# Patient Record
Sex: FEMALE | Race: OTHER | HISPANIC OR LATINO | URBAN - METROPOLITAN AREA
[De-identification: names, ages, dates, MRNs, and addresses within clinical notes are randomized per-mention and may not be internally consistent; named-entity substitution may affect disease eponyms.]

---

## 2017-11-01 ENCOUNTER — EMERGENCY (EMERGENCY)
Facility: HOSPITAL | Age: 32
LOS: 1 days | Discharge: HOME | End: 2017-11-01
Attending: EMERGENCY MEDICINE
Payer: SELF-PAY

## 2017-11-01 VITALS
OXYGEN SATURATION: 100 % | WEIGHT: 121.03 LBS | TEMPERATURE: 98 F | SYSTOLIC BLOOD PRESSURE: 124 MMHG | HEART RATE: 61 BPM | RESPIRATION RATE: 16 BRPM | DIASTOLIC BLOOD PRESSURE: 78 MMHG | HEIGHT: 62 IN

## 2017-11-01 VITALS — SYSTOLIC BLOOD PRESSURE: 118 MMHG | HEART RATE: 60 BPM | OXYGEN SATURATION: 99 % | RESPIRATION RATE: 16 BRPM

## 2017-11-01 LAB
ALBUMIN SERPL ELPH-MCNC: 4.2 G/DL — SIGNIFICANT CHANGE UP (ref 3.3–5.2)
ALP SERPL-CCNC: 87 U/L — SIGNIFICANT CHANGE UP (ref 40–120)
ALT FLD-CCNC: 20 U/L — SIGNIFICANT CHANGE UP
ANION GAP SERPL CALC-SCNC: 13 MMOL/L — SIGNIFICANT CHANGE UP (ref 5–17)
AST SERPL-CCNC: 21 U/L — SIGNIFICANT CHANGE UP
BASOPHILS # BLD AUTO: 0 K/UL — SIGNIFICANT CHANGE UP (ref 0–0.2)
BASOPHILS NFR BLD AUTO: 0.5 % — SIGNIFICANT CHANGE UP (ref 0–2)
BILIRUB SERPL-MCNC: 0.8 MG/DL — SIGNIFICANT CHANGE UP (ref 0.4–2)
BUN SERPL-MCNC: 11 MG/DL — SIGNIFICANT CHANGE UP (ref 8–20)
CALCIUM SERPL-MCNC: 9.3 MG/DL — SIGNIFICANT CHANGE UP (ref 8.6–10.2)
CHLORIDE SERPL-SCNC: 104 MMOL/L — SIGNIFICANT CHANGE UP (ref 98–107)
CO2 SERPL-SCNC: 22 MMOL/L — SIGNIFICANT CHANGE UP (ref 22–29)
CREAT SERPL-MCNC: 0.58 MG/DL — SIGNIFICANT CHANGE UP (ref 0.5–1.3)
D DIMER BLD IA.RAPID-MCNC: <150 NG/ML DDU — SIGNIFICANT CHANGE UP
EOSINOPHIL # BLD AUTO: 0.2 K/UL — SIGNIFICANT CHANGE UP (ref 0–0.5)
EOSINOPHIL NFR BLD AUTO: 2.6 % — SIGNIFICANT CHANGE UP (ref 0–6)
GLUCOSE SERPL-MCNC: 93 MG/DL — SIGNIFICANT CHANGE UP (ref 70–115)
HCG SERPL-ACNC: <2 MIU/ML — SIGNIFICANT CHANGE UP
HCT VFR BLD CALC: 38.9 % — SIGNIFICANT CHANGE UP (ref 37–47)
HGB BLD-MCNC: 13.4 G/DL — SIGNIFICANT CHANGE UP (ref 12–16)
LYMPHOCYTES # BLD AUTO: 2.8 K/UL — SIGNIFICANT CHANGE UP (ref 1–4.8)
LYMPHOCYTES # BLD AUTO: 32 % — SIGNIFICANT CHANGE UP (ref 20–55)
MCHC RBC-ENTMCNC: 30.9 PG — SIGNIFICANT CHANGE UP (ref 27–31)
MCHC RBC-ENTMCNC: 34.4 G/DL — SIGNIFICANT CHANGE UP (ref 32–36)
MCV RBC AUTO: 89.6 FL — SIGNIFICANT CHANGE UP (ref 81–99)
MONOCYTES # BLD AUTO: 0.5 K/UL — SIGNIFICANT CHANGE UP (ref 0–0.8)
MONOCYTES NFR BLD AUTO: 5.2 % — SIGNIFICANT CHANGE UP (ref 3–10)
NEUTROPHILS # BLD AUTO: 5.2 K/UL — SIGNIFICANT CHANGE UP (ref 1.8–8)
NEUTROPHILS NFR BLD AUTO: 59.4 % — SIGNIFICANT CHANGE UP (ref 37–73)
PLATELET # BLD AUTO: 238 K/UL — SIGNIFICANT CHANGE UP (ref 150–400)
POTASSIUM SERPL-MCNC: 3.7 MMOL/L — SIGNIFICANT CHANGE UP (ref 3.5–5.3)
POTASSIUM SERPL-SCNC: 3.7 MMOL/L — SIGNIFICANT CHANGE UP (ref 3.5–5.3)
PROT SERPL-MCNC: 7.9 G/DL — SIGNIFICANT CHANGE UP (ref 6.6–8.7)
RBC # BLD: 4.34 M/UL — LOW (ref 4.4–5.2)
RBC # FLD: 12.1 % — SIGNIFICANT CHANGE UP (ref 11–15.6)
SODIUM SERPL-SCNC: 139 MMOL/L — SIGNIFICANT CHANGE UP (ref 135–145)
TROPONIN T SERPL-MCNC: <0.01 NG/ML — SIGNIFICANT CHANGE UP (ref 0–0.06)
WBC # BLD: 8.8 K/UL — SIGNIFICANT CHANGE UP (ref 4.8–10.8)
WBC # FLD AUTO: 8.8 K/UL — SIGNIFICANT CHANGE UP (ref 4.8–10.8)

## 2017-11-01 PROCEDURE — 71045 X-RAY EXAM CHEST 1 VIEW: CPT

## 2017-11-01 PROCEDURE — 36415 COLL VENOUS BLD VENIPUNCTURE: CPT

## 2017-11-01 PROCEDURE — 93005 ELECTROCARDIOGRAM TRACING: CPT

## 2017-11-01 PROCEDURE — 99283 EMERGENCY DEPT VISIT LOW MDM: CPT | Mod: 25

## 2017-11-01 PROCEDURE — 84702 CHORIONIC GONADOTROPIN TEST: CPT

## 2017-11-01 PROCEDURE — 84484 ASSAY OF TROPONIN QUANT: CPT

## 2017-11-01 PROCEDURE — 85379 FIBRIN DEGRADATION QUANT: CPT

## 2017-11-01 PROCEDURE — T1013: CPT

## 2017-11-01 PROCEDURE — 80053 COMPREHEN METABOLIC PANEL: CPT

## 2017-11-01 PROCEDURE — 85027 COMPLETE CBC AUTOMATED: CPT

## 2017-11-01 PROCEDURE — 99285 EMERGENCY DEPT VISIT HI MDM: CPT

## 2017-11-01 PROCEDURE — 93010 ELECTROCARDIOGRAM REPORT: CPT

## 2017-11-01 PROCEDURE — 71010: CPT | Mod: 26

## 2017-11-01 RX ORDER — METHOCARBAMOL 500 MG/1
1500 TABLET, FILM COATED ORAL ONCE
Qty: 0 | Refills: 0 | Status: COMPLETED | OUTPATIENT
Start: 2017-11-01 | End: 2017-11-01

## 2017-11-01 RX ORDER — METHOCARBAMOL 500 MG/1
2 TABLET, FILM COATED ORAL
Qty: 24 | Refills: 0 | OUTPATIENT
Start: 2017-11-01 | End: 2017-11-05

## 2017-11-01 RX ORDER — ACETAMINOPHEN 500 MG
650 TABLET ORAL ONCE
Qty: 0 | Refills: 0 | Status: COMPLETED | OUTPATIENT
Start: 2017-11-01 | End: 2017-11-01

## 2017-11-01 RX ORDER — IBUPROFEN 200 MG
1 TABLET ORAL
Qty: 20 | Refills: 0 | OUTPATIENT
Start: 2017-11-01 | End: 2017-11-06

## 2017-11-01 RX ADMIN — METHOCARBAMOL 1500 MILLIGRAM(S): 500 TABLET, FILM COATED ORAL at 14:59

## 2017-11-01 RX ADMIN — Medication 650 MILLIGRAM(S): at 16:09

## 2017-11-01 RX ADMIN — Medication 650 MILLIGRAM(S): at 14:59

## 2017-11-01 NOTE — ED ADULT TRIAGE NOTE - CHIEF COMPLAINT QUOTE
pt presents to ED with chest pain and SOB  since 0900 this morning. denies n/v, a&ox3. breathing is even and unlabored.

## 2017-11-03 NOTE — ED PROVIDER NOTE - OBJECTIVE STATEMENT
31 yo female with 1 hour history of chest pain and shortness of breath; denies fever, chills , nausea or vomiting; no meds taken for pain

## 2018-09-18 ENCOUNTER — EMERGENCY (EMERGENCY)
Facility: HOSPITAL | Age: 33
LOS: 1 days | Discharge: HOME | End: 2018-09-18
Attending: EMERGENCY MEDICINE
Payer: MEDICAID

## 2018-09-18 VITALS
SYSTOLIC BLOOD PRESSURE: 102 MMHG | HEART RATE: 75 BPM | DIASTOLIC BLOOD PRESSURE: 71 MMHG | OXYGEN SATURATION: 100 % | RESPIRATION RATE: 18 BRPM | TEMPERATURE: 98 F

## 2018-09-18 VITALS — HEIGHT: 62 IN | WEIGHT: 121.03 LBS

## 2018-09-18 LAB
ANION GAP SERPL CALC-SCNC: 13 MMOL/L — SIGNIFICANT CHANGE UP (ref 5–17)
BUN SERPL-MCNC: 13 MG/DL — SIGNIFICANT CHANGE UP (ref 8–20)
CALCIUM SERPL-MCNC: 9.5 MG/DL — SIGNIFICANT CHANGE UP (ref 8.6–10.2)
CHLORIDE SERPL-SCNC: 106 MMOL/L — SIGNIFICANT CHANGE UP (ref 98–107)
CO2 SERPL-SCNC: 20 MMOL/L — LOW (ref 22–29)
CREAT SERPL-MCNC: 0.7 MG/DL — SIGNIFICANT CHANGE UP (ref 0.5–1.3)
D DIMER BLD IA.RAPID-MCNC: 209 NG/ML DDU — SIGNIFICANT CHANGE UP
GLUCOSE SERPL-MCNC: 97 MG/DL — SIGNIFICANT CHANGE UP (ref 70–115)
HCG UR QL: NEGATIVE — SIGNIFICANT CHANGE UP
HCT VFR BLD CALC: 44.2 % — SIGNIFICANT CHANGE UP (ref 37–47)
HGB BLD-MCNC: 14.6 G/DL — SIGNIFICANT CHANGE UP (ref 12–16)
MAGNESIUM SERPL-MCNC: 2 MG/DL — SIGNIFICANT CHANGE UP (ref 1.6–2.6)
MCHC RBC-ENTMCNC: 30 PG — SIGNIFICANT CHANGE UP (ref 27–31)
MCHC RBC-ENTMCNC: 33 G/DL — SIGNIFICANT CHANGE UP (ref 32–36)
MCV RBC AUTO: 90.9 FL — SIGNIFICANT CHANGE UP (ref 81–99)
PLATELET # BLD AUTO: 210 K/UL — SIGNIFICANT CHANGE UP (ref 150–400)
POTASSIUM SERPL-MCNC: 3.5 MMOL/L — SIGNIFICANT CHANGE UP (ref 3.5–5.3)
POTASSIUM SERPL-SCNC: 3.5 MMOL/L — SIGNIFICANT CHANGE UP (ref 3.5–5.3)
RBC # BLD: 4.86 M/UL — SIGNIFICANT CHANGE UP (ref 4.4–5.2)
RBC # FLD: 12.4 % — SIGNIFICANT CHANGE UP (ref 11–15.6)
SODIUM SERPL-SCNC: 139 MMOL/L — SIGNIFICANT CHANGE UP (ref 135–145)
WBC # BLD: 9.3 K/UL — SIGNIFICANT CHANGE UP (ref 4.8–10.8)
WBC # FLD AUTO: 9.3 K/UL — SIGNIFICANT CHANGE UP (ref 4.8–10.8)

## 2018-09-18 PROCEDURE — 85379 FIBRIN DEGRADATION QUANT: CPT

## 2018-09-18 PROCEDURE — 80048 BASIC METABOLIC PNL TOTAL CA: CPT

## 2018-09-18 PROCEDURE — 81025 URINE PREGNANCY TEST: CPT

## 2018-09-18 PROCEDURE — 96374 THER/PROPH/DIAG INJ IV PUSH: CPT

## 2018-09-18 PROCEDURE — 71046 X-RAY EXAM CHEST 2 VIEWS: CPT | Mod: 26

## 2018-09-18 PROCEDURE — 71046 X-RAY EXAM CHEST 2 VIEWS: CPT

## 2018-09-18 PROCEDURE — 93005 ELECTROCARDIOGRAM TRACING: CPT

## 2018-09-18 PROCEDURE — 85027 COMPLETE CBC AUTOMATED: CPT

## 2018-09-18 PROCEDURE — 83735 ASSAY OF MAGNESIUM: CPT

## 2018-09-18 PROCEDURE — 36415 COLL VENOUS BLD VENIPUNCTURE: CPT

## 2018-09-18 PROCEDURE — T1013: CPT

## 2018-09-18 PROCEDURE — 93010 ELECTROCARDIOGRAM REPORT: CPT

## 2018-09-18 PROCEDURE — 99284 EMERGENCY DEPT VISIT MOD MDM: CPT | Mod: 25

## 2018-09-18 PROCEDURE — 99285 EMERGENCY DEPT VISIT HI MDM: CPT | Mod: 25

## 2018-09-18 RX ORDER — SODIUM CHLORIDE 9 MG/ML
1000 INJECTION INTRAMUSCULAR; INTRAVENOUS; SUBCUTANEOUS ONCE
Qty: 0 | Refills: 0 | Status: COMPLETED | OUTPATIENT
Start: 2018-09-18 | End: 2018-09-18

## 2018-09-18 RX ORDER — KETOROLAC TROMETHAMINE 30 MG/ML
15 SYRINGE (ML) INJECTION ONCE
Qty: 0 | Refills: 0 | Status: DISCONTINUED | OUTPATIENT
Start: 2018-09-18 | End: 2018-09-18

## 2018-09-18 RX ADMIN — Medication 15 MILLIGRAM(S): at 23:42

## 2018-09-18 RX ADMIN — SODIUM CHLORIDE 1000 MILLILITER(S): 9 INJECTION INTRAMUSCULAR; INTRAVENOUS; SUBCUTANEOUS at 23:22

## 2018-09-18 NOTE — ED ADULT NURSE NOTE - OBJECTIVE STATEMENT
Patient presents to ER complaining of chest discomfort with onset at 2100, patient reports chest discomfort during inspiration, denies N/V, no fever, resp even/unlabored.

## 2018-09-18 NOTE — ED PROVIDER NOTE - MEDICAL DECISION MAKING DETAILS
Pt with pleuritic chest pain, no travel hx, EKG with S1Q3T3, will check blood work and D-dimer to r/o PE, CXR, and give toradol for pain.

## 2018-09-18 NOTE — ED PROVIDER NOTE - MUSCULOSKELETAL, MLM
Spine appears normal, range of motion is not limited, chest wall tenderness on palpation.  No lower extremity swelling bilaterally

## 2018-09-18 NOTE — ED PROVIDER NOTE - OBJECTIVE STATEMENT
32 y/o F, with no pertinent medical hx, presents to the ED c/o sudden onset, constant midsternal, nonradiating chest pain, onset 9:00PM.  Pt states that pain is sharp and stabbing in nature and rated 5/10.  Pain onset while at rest.  Pain is worse when breathing deeply.  Associated sx include generalized weakness.  Denies self medicating for pain.  Pt notes similar sx in the past, and was seen in the ED.  Denies following up outpatient because pain resolved.  Denies recent travel.  Denies heavy lifting and strenuous activity.  No hx of blood clots.  Pt currently receives birth control injections.  Denies following with a PCP.  Denies fever, chills, SOB, cough, N/V/D, visual changes, numbness, tingling, or HA.

## 2020-11-17 NOTE — ED ADULT NURSE NOTE - PAIN RATING/NUMBER SCALE (0-10): REST
Elidia Lua is a 32 year old female presenting with an annual physical without pap    Concerns: cramping pains in stomach  Last October- had Fallopian tubes removed but still has IUD  Ovarian cyst back in July as well   Menstrual issues are different    Pap: 9/19/19  Mammo:NA  Colonoscopy:NA  Tdap: 5/5/14  Influenza: 9/20/18    medications verified and updated  Preferred pharmacy added   Denies Latex allergy or sensitivity  Social History     Tobacco Use   Smoking Status Never Smoker   Smokeless Tobacco Never Used     Patient would like communication of their results via:      Cell Phone:   Telephone Information:   Mobile [phone]     Okay to leave a message containing results? Yes    Health Maintenance Due   Topic Date Due   • Depression Screening  08/31/2000   • Influenza Vaccine (1) 09/01/2020     Patient is due for the topics as listed above and wishes to proceed with them. Depression screening done today. Flu shot discussed with patient.    Vaccine Information Statement(s) was given today. This has been reviewed, questions answered, and verbal consent given by Patient for injection(s) and administration of Influenza (Inactivated).    Patient tolerated without incident. See immunization grid for documentation.         5

## 2022-03-20 ENCOUNTER — EMERGENCY (EMERGENCY)
Facility: HOSPITAL | Age: 37
LOS: 1 days | Discharge: HOME | End: 2022-03-20
Attending: EMERGENCY MEDICINE
Payer: MEDICAID

## 2022-03-20 VITALS
OXYGEN SATURATION: 98 % | TEMPERATURE: 98 F | WEIGHT: 95.02 LBS | DIASTOLIC BLOOD PRESSURE: 65 MMHG | HEIGHT: 62 IN | SYSTOLIC BLOOD PRESSURE: 99 MMHG | RESPIRATION RATE: 18 BRPM | HEART RATE: 77 BPM

## 2022-03-20 LAB
APPEARANCE UR: ABNORMAL
BILIRUB UR-MCNC: ABNORMAL
COLOR SPEC: YELLOW — SIGNIFICANT CHANGE UP
DIFF PNL FLD: ABNORMAL
EPI CELLS # UR: SIGNIFICANT CHANGE UP
GLUCOSE UR QL: NEGATIVE MG/DL — SIGNIFICANT CHANGE UP
HCG UR QL: NEGATIVE — SIGNIFICANT CHANGE UP
KETONES UR-MCNC: ABNORMAL
LEUKOCYTE ESTERASE UR-ACNC: ABNORMAL
NITRITE UR-MCNC: NEGATIVE — SIGNIFICANT CHANGE UP
PH UR: 5 — SIGNIFICANT CHANGE UP (ref 5–8)
PROT UR-MCNC: 15
RBC CASTS # UR COMP ASSIST: ABNORMAL /HPF (ref 0–4)
SP GR SPEC: 1.02 — SIGNIFICANT CHANGE UP (ref 1.01–1.02)
UROBILINOGEN FLD QL: NEGATIVE MG/DL — SIGNIFICANT CHANGE UP
WBC UR QL: SIGNIFICANT CHANGE UP /HPF (ref 0–5)

## 2022-03-20 PROCEDURE — 99284 EMERGENCY DEPT VISIT MOD MDM: CPT

## 2022-03-20 RX ORDER — SODIUM CHLORIDE 9 MG/ML
1000 INJECTION INTRAMUSCULAR; INTRAVENOUS; SUBCUTANEOUS ONCE
Refills: 0 | Status: COMPLETED | OUTPATIENT
Start: 2022-03-20 | End: 2022-03-20

## 2022-03-20 RX ORDER — FAMOTIDINE 10 MG/ML
20 INJECTION INTRAVENOUS ONCE
Refills: 0 | Status: COMPLETED | OUTPATIENT
Start: 2022-03-20 | End: 2022-03-20

## 2022-03-20 RX ORDER — ONDANSETRON 8 MG/1
4 TABLET, FILM COATED ORAL ONCE
Refills: 0 | Status: COMPLETED | OUTPATIENT
Start: 2022-03-20 | End: 2022-03-20

## 2022-03-20 RX ORDER — MORPHINE SULFATE 50 MG/1
2 CAPSULE, EXTENDED RELEASE ORAL ONCE
Refills: 0 | Status: DISCONTINUED | OUTPATIENT
Start: 2022-03-20 | End: 2022-03-20

## 2022-03-20 RX ADMIN — SODIUM CHLORIDE 1000 MILLILITER(S): 9 INJECTION INTRAMUSCULAR; INTRAVENOUS; SUBCUTANEOUS at 22:16

## 2022-03-20 RX ADMIN — SODIUM CHLORIDE 1000 MILLILITER(S): 9 INJECTION INTRAMUSCULAR; INTRAVENOUS; SUBCUTANEOUS at 23:42

## 2022-03-20 RX ADMIN — MORPHINE SULFATE 2 MILLIGRAM(S): 50 CAPSULE, EXTENDED RELEASE ORAL at 22:16

## 2022-03-20 RX ADMIN — ONDANSETRON 4 MILLIGRAM(S): 8 TABLET, FILM COATED ORAL at 23:26

## 2022-03-20 RX ADMIN — FAMOTIDINE 20 MILLIGRAM(S): 10 INJECTION INTRAVENOUS at 22:16

## 2022-03-20 NOTE — ED STATDOCS - PROGRESS NOTE DETAILS
MINGO Teixeira: No abdominal pain. Abdomen soft, nontender. MINGO Teixeira: Patient evaluated by intake physician. HPI/ROS/PE as noted above. Will follow up plan per intake physician and continue to assess patient. MINGO Teixeira: No vomiting. MINGO Teixeira: Tolerated PO. MINGO Teixeira: Tolerated PO. Language Line Kirti ID#: [id number]. MINGO Teixeira: BP low at time of discharge. Patient already received 2L. Check basics and reassess. MINGO Teixeira: BP noted. Appropriate for weight. Also received Morphine in ED. Feels well. Stable for discharge.

## 2022-03-20 NOTE — ED ADULT NURSE NOTE - NSIMPLEMENTINTERV_GEN_ALL_ED
Implemented All Universal Safety Interventions:  [place] to call system. Call bell, personal items and telephone within reach. Instruct patient to call for assistance. Room bathroom lighting operational. Non-slip footwear when patient is off stretcher. Physically safe environment: no spills, clutter or unnecessary equipment. Stretcher in lowest position, wheels locked, appropriate side rails in place.

## 2022-03-20 NOTE — ED STATDOCS - NSFOLLOWUPINSTRUCTIONS_ED_ALL_ED_FT
- Prescription sent to pharmacy.  - Increase fluids.  - [place] diet as tolerated. Avoid citrus based food/drink, spicy/greasy foods, milk, caffeine.   - Please call to schedule follow up appointment with your primary care physician within 24-48 hours.  - Please seek immediate medical attention for any new/worsening, signs/symptoms, or concerns.    Feel better!    - Incrementar los líquidos.  - Dieta blanda según la tolerancia. Evite los alimentos / bebidas a base de cítricos, los alimentos picantes / grasosos, la leche y la cafeína.  - Llame para programar noe jhon de seguimiento con lopez médico de atención primaria dentro de las 24 a 48 horas.  - Busque atención médica inmediata ante cualquier nuevo / empeoramiento, signo / síntoma o inquietud.    ¡Sentirse mejor!      Diarrea en los adultos    Diarrhea, Adult      La diarrea consiste en deposiciones frecuentes, blandas o acuosas. La diarrea puede hacerlo sentir débil y deshidratarlo. La deshidratación puede causarle cansancio, sed, sequedad en la boca y disminución en la frecuencia con la que orina.    Generalmente, la diarrea dura entre 2 y 3 días. Sin embargo, puede durar más tiempo si se trata de un signo de algo más karlene. Es importante tratar la diarrea kevin se lo haya indicado el médico.      Siga estas indicaciones en lopez casa:      Comida y bebida                   Siga estas recomendaciones kevin se lo haya indicado el médico:  •[place] noe solución de rehidratación oral (oral rehydration solution, ORS). Es un medicamento de venta doris que ayuda a que el cuerpo recupere el equilibrio normal de nutrientes y agua. Se la encuentra en farmacias y tiendas minoristas.      •Jodi abundantes líquidos, kevin agua, trocitos de hielo, jugos de fruta rebajados con agua y bebidas deportivas bajas en calorías. Puede beber leche también, si lo desea.      •Evite consumir líquidos que contengan mucha azúcar o cafeína, kevin bebidas energéticas, bebidas deportivas y refrescos.      •En la medida en que pueda, consuma alimentos blandos y fáciles de digerir en pequeñas cantidades. Estos alimentos incluyen bananas, compota de manzana, arroz, rocky magras, tostadas y galletas saladas.      •Evite kwasi alcohol.      •Evite los alimentos condimentados o con alto contenido de grasa.      Medicamentos     •[place] los medicamentos de venta doris y los recetados solamente kevin se lo haya indicado el médico.      •Si le recetaron un antibiótico, tómelo kevin se lo haya indicado el médico. No deje de usar el antibiótico aunque comience a sentirse mejor.        Indicaciones generales    •Lávese las coby frecuentemente usando agua y jabón. Use desinfectante para coby si no dispone de agua y jabón. Las demás personas de la casa deben lavarse las coby también. Las coby deben lavarse:  •Después de usar el baño o cambiar un pañal.       •Antes de preparar, cocinar o servir la comida.       •Mientras cuida de noe persona enferma, o cuando visita a alguien en el hospital.         •Jodi suficiente líquido kevin para mantener la orina de color amarillo pálido.      •Descanse en lopez casa mientras se recupera.      •Controle lopez afección para detectar cualquier cambio.      •[place] un baño caliente para ayudar a disminuir el ardor o el dolor causados por los episodios frecuentes de diarrea.      •Concurra a todas las visitas de control kevin se lo haya indicado el médico. [place] es importante.        Comuníquese con un médico si:    •Tiene fiebre.      •La diarrea empeora.      •Aparecen nuevos síntomas.      •No puede retener los líquidos.      •Se siente aturdido o mareado.      •Tiene dolor de shimon.      •Tiene calambres musculares.        Solicite ayuda inmediatamente si:    •Siente dolor en el pecho.      •Se siente muy débil o se desmaya.      •Tiene heces con tyron, de color chito, o con aspecto alquitranado.      •Tiene dolor intenso, cólicos o distensión abdominal.      •Tiene problemas para respirar o respira muy rápidamente.      •Lopez corazón late muy rápidamente.      •Siente la piel fría y húmeda.      •Se siente confundido.    •Tiene signos de deshidratación, kevin los siguientes:  •Orina de color oscuro, muy escasa o falta de orina.      •Labios agrietados.      •Sequedad de boca.      •Ojos hundidos.      •Somnolencia.      •Debilidad.          Resumen    •La diarrea consiste en deposiciones frecuentes, blandas o acuosas. La diarrea puede hacerlo sentir débil y deshidratarlo.      •Jodi suficiente líquido para mantener la orina de color amarillo pálido.      •Asegúrese de lavarse las coby después de usar el baño. Use desinfectante para coby si no dispone de agua y jabón.      •Comuníquese con un médico si la diarrea empeora o tiene nuevos síntomas.      •Busque ayuda de inmediato si tiene signos de deshidratación.      Esta información no tiene kevin fin reemplazar el consejo del médico. Asegúrese de hacerle al médico cualquier pregunta que tenga.      Dieta suave    [place] Diet      Noe dieta suave se compone de alimentos que, en general, son blandos y no tienen mucha grasa, fibra ni condimentos adicionales. Para el cuerpo, es más fácil digerir alimentos sin grasa, fibra o condimentos. Además, es menos probable que estos causen irritación en la boca, la garganta, el estómago y otras partes de aparato digestivo. A menudo, se conoce a la dieta suave kevin dieta BRAT (Bananas, Rice, Applesauce, Toast [bananas, arroz, puré de manzana y pan mikie]).      ¿En qué consiste el plan?    Lopez médico o especialista en nutrición y alimentos (nutricionista) pueden recomendar cambios específicos en lopez dieta para evitar o tratar donna síntomas. Estos cambios pueden incluir lo siguiente:  •Consumir pequeñas cantidades de comida con frecuencia.      •Cocinar los alimentos hasta que estén lo bastante blandos para masticarlos con facilidad.      •Masticar tyrone la comida.      •Beber líquidos lentamente.      •No consumir alimentos muy picantes, ácidos o grasosos.      •No comer frutas cítricas, kevin naranjas y toronjas.        ¿Qué lizett saber acerca de esta dieta?    •Consuma diferentes alimentos de la lista de alimentos de la dieta suave.      • No siga noe dieta suave nunu más tiempo del necesario.      •Pregúntele a lopez médico si debe kwasi vitaminas o suplementos.        ¿Qué alimentos puedo comer?      Cereales      Cereales calientes, kevin crema de jay. Arroz. Panes, galletas o tortillas elaborados con harina isa refinada.    Verduras     Verduras cocidas o enlatadas. Puré de kalli o kalli hervidas.      Frutas      Bananas. Puré de manzana. Otros tipos de frutas cocidas o enlatadas peladas y sin semillas, por ejemplo, duraznos o peras en larissa.      Rocky y otras proteínas      Huevos revueltos. Mantequilla de maní cremosa u otras mantequillas de mili secos. Rocky magras tyrone cocidas, kevin ave o pescado. Tofu. Sopas o caldos.    Lácteos     Productos lácteos sin grasa, kevin leche, queso cottage y yogur.      Bebidas      [place]. Té de hierbas. Jugo de manzana.    Grasas y aceites     Aderezos suaves para ensaladas. Aceite de canola o de espinosa.    Dulces y postres     Pudin. Natillas. Gelatina de frutas. Helados.    Es posible que los productos mencionados arriba no formen noe lista completa de las bebidas o los alimentos recomendados. Comuníquese con un nutricionista para conocer más opciones.       ¿Qué alimentos no se recomiendan?    Cereales     Panes y cereales integrales.    Verduras     Verduras crudas.    Frutas     Frutas crudas, especialmente los cítricos, mili rojos o frutas secas.    Lácteos     Productos lácteos enteros.    Bebidas     Bebidas que contengan cafeína. Alcohol.    Aliños y condimentos     Aderezos o condimentos muy saborizados. Salsa picante. Salsa.    Otros alimentos     Comidas picantes. Comidas fritas. Alimentos ácidos, kevin encurtidos o alimentos fermentados. Alimentos con alto contenido de azúcar. Alimentos ricos en fibra.    Es posible que los productos que se enumeran más arriba no moreno noe lista completa de los alimentos y las bebidas que se deben evitar. Comuníquese con un nutricionista para obtener más información.       Resumen    •Noe dieta suave se compone de alimentos que, en general, son blandos y no tienen mucha grasa, fibra ni condimentos adicionales.      •Para el cuerpo, es más fácil digerir alimentos sin grasa, fibra o condimentos.      •Consulte a lopez médico para khushi cuánto tiempo debe seguir nicholas plan de alimentación. Esta dieta no está destinada a seguirse nunu mucho tiempo.      Esta información no tiene kevin fin reemplazar el consejo del médico. Asegúrese de hacerle al médico cualquier pregunta que tenga. - Prescription sent to pharmacy.  - Increase fluids.  - [place] diet as tolerated. Avoid citrus based food/drink, spicy/greasy foods, milk, caffeine.   - Please call to schedule follow up appointment with your primary care physician within 24-48 hours.  - Please seek immediate medical attention for any new/worsening, signs/symptoms, or concerns.    Feel better!    - Incrementar los líquidos.  - Dieta blanda según la tolerancia. Evite los alimentos / bebidas a base de cítricos, los alimentos picantes / grasosos, la leche y la cafeína.  - Llame para programar noe jhon de seguimiento con graf médico de atención primaria dentro de las 24 a 48 horas.  - Busque atención médica inmediata ante cualquier nuevo / empeoramiento, signo / síntoma o inquietud.    ¡Sentirse mejor!      Diarrea en los adultos    Diarrhea, Adult      La diarrea consiste en deposiciones frecuentes, blandas o acuosas. La diarrea puede hacerlo sentir débil y deshidratarlo. La deshidratación puede causarle cansancio, sed, sequedad en la boca y disminución en la frecuencia con la que orina.    Generalmente, la diarrea dura entre 2 y 3 días. Sin embargo, puede durar más tiempo si se trata de un signo de algo más karlene. Es importante tratar la diarrea kevin se lo haya indicado el médico.      Siga estas indicaciones en graf casa:      Comida y bebida                   Siga estas recomendaciones kevin se lo haya indicado el médico:  •[place] noe solución de rehidratación oral (oral rehydration solution, ORS). Es un medicamento de venta doris que ayuda a que el cuerpo recupere el equilibrio normal de nutrientes y agua. Se la encuentra en farmacias y tiendas minoristas.      •Jodi abundantes líquidos, kevin agua, trocitos de hielo, jugos de fruta rebajados con agua y bebidas deportivas bajas en calorías. Puede beber leche también, si lo desea.      •Evite consumir líquidos que contengan mucha azúcar o cafeína, kevin bebidas energéticas, bebidas deportivas y refrescos.      •En la medida en que pueda, consuma alimentos blandos y fáciles de digerir en pequeñas cantidades. Estos alimentos incluyen bananas, compota de manzana, arroz, rocky magras, tostadas y galletas saladas.      •Evite kwasi alcohol.      •Evite los alimentos condimentados o con alto contenido de grasa.      Medicamentos     •[place] los medicamentos de venta doris y los recetados solamente kevin se lo haya indicado el médico.      •Si le recetaron un antibiótico, tómelo kevin se lo haya indicado el médico. No deje de usar el antibiótico aunque comience a sentirse mejor.        Indicaciones generales    •Lávese las coby frecuentemente usando agua y jabón. Use desinfectante para coby si no dispone de agua y jabón. Las demás personas de la casa deben lavarse las coby también. Las coby deben lavarse:  •Después de usar el baño o cambiar un pañal.       •Antes de preparar, cocinar o servir la comida.       •Mientras cuida de noe persona enferma, o cuando visita a alguien en el hospital.         •Jodi suficiente líquido kevin para mantener la orina de color amarillo pálido.      •Descanse en graf casa mientras se recupera.      •Controle graf afección para detectar cualquier cambio.      •[place] un baño caliente para ayudar a disminuir el ardor o el dolor causados por los episodios frecuentes de diarrea.      •Concurra a todas las visitas de control kevin se lo haya indicado el médico. [place] es importante.        Comuníquese con un médico si:    •Tiene fiebre.      •La diarrea empeora.      •Aparecen nuevos síntomas.      •No puede retener los líquidos.      •Se siente aturdido o mareado.      •Tiene dolor de shimon.      •Tiene calambres musculares.        Solicite ayuda inmediatamente si:    •Siente dolor en el pecho.      •Se siente muy débil o se desmaya.      •Tiene heces con tyron, de color chito, o con aspecto alquitranado.      •Tiene dolor intenso, cólicos o distensión abdominal.      •Tiene problemas para respirar o respira muy rápidamente.      •Graf corazón late muy rápidamente.      •Siente la piel fría y húmeda.      •Se siente confundido.    •Tiene signos de deshidratación, kevin los siguientes:  •Orina de color oscuro, muy escasa o falta de orina.      •Labios agrietados.      •Sequedad de boca.      •Ojos hundidos.      •Somnolencia.      •Debilidad.          Resumen    •La diarrea consiste en deposiciones frecuentes, blandas o acuosas. La diarrea puede hacerlo sentir débil y deshidratarlo.      •Jodi suficiente líquido para mantener la orina de color amarillo pálido.      •Asegúrese de lavarse las coby después de usar el baño. Use desinfectante para coby si no dispone de agua y jabón.      •Comuníquese con un médico si la diarrea empeora o tiene nuevos síntomas.      •Busque ayuda de inmediato si tiene signos de deshidratación.      Esta información no tiene kevin fin reemplazar el consejo del médico. Asegúrese de hacerle al médico cualquier pregunta que tenga.      Dieta suave    [place] Diet      Noe dieta suave se compone de alimentos que, en general, son blandos y no tienen mucha grasa, fibra ni condimentos adicionales. Para el cuerpo, es más fácil digerir alimentos sin grasa, fibra o condimentos. Además, es menos probable que estos causen irritación en la boca, la garganta, el estómago y otras partes de aparato digestivo. A menudo, se conoce a la dieta suave kevin dieta BRAT (Bananas, Rice, Applesauce, Toast [bananas, arroz, puré de manzana y pan mikie]).      ¿En qué consiste el plan?    Graf médico o especialista en nutrición y alimentos (nutricionista) pueden recomendar cambios específicos en graf dieta para evitar o tratar melissa síntomas. Estos cambios pueden incluir lo siguiente:  •Consumir pequeñas cantidades de comida con frecuencia.      •Cocinar los alimentos hasta que estén lo bastante blandos para masticarlos con facilidad.      •Masticar tyrone la comida.      •Beber líquidos lentamente.      •No consumir alimentos muy picantes, ácidos o grasosos.      •No comer frutas cítricas, kevin naranjas y toronjas.        ¿Qué lizett saber acerca de esta dieta?    •Consuma diferentes alimentos de la lista de alimentos de la dieta suave.      • No siga noe dieta suave nunu más tiempo del necesario.      •Pregúntele a graf médico si debe kwasi vitaminas o suplementos.        ¿Qué alimentos puedo comer?      Cereales      Cereales calientes, kevin crema de jay. Arroz. Panes, galletas o tortillas elaborados con harina isa refinada.    Verduras     Verduras cocidas o enlatadas. Puré de kalli o kalli hervidas.      Frutas      Bananas. Puré de manzana. Otros tipos de frutas cocidas o enlatadas peladas y sin semillas, por ejemplo, duraznos o peras en larissa.      Rocky y otras proteínas      Huevos revueltos. Mantequilla de maní cremosa u otras mantequillas de mili secos. Rocky magras tyrone cocidas, kevin ave o pescado. Tofu. Sopas o caldos.    Lácteos     Productos lácteos sin grasa, kevin leche, queso cottage y yogur.      Bebidas      [place]. Té de hierbas. Jugo de manzana.    Grasas y aceites     Aderezos suaves para ensaladas. Aceite de canola o de espinosa.    Dulces y postres     Pudin. Natillas. Gelatina de frutas. Helados.    Es posible que los productos mencionados arriba no formen noe lista completa de las bebidas o los alimentos recomendados. Comuníquese con un nutricionista para conocer más opciones.       ¿Qué alimentos no se recomiendan?    Cereales     Panes y cereales integrales.    Verduras     Verduras crudas.    Frutas     Frutas crudas, especialmente los cítricos, mili rojos o frutas secas.    Lácteos     Productos lácteos enteros.    Bebidas     Bebidas que contengan cafeína. Alcohol.    Aliños y condimentos     Aderezos o condimentos muy saborizados. Salsa picante. Salsa.    Otros alimentos     Comidas picantes. Comidas fritas. Alimentos ácidos, kevin encurtidos o alimentos fermentados. Alimentos con alto contenido de azúcar. Alimentos ricos en fibra.    Es posible que los productos que se enumeran más arriba no moreno noe lista completa de los alimentos y las bebidas que se deben evitar. Comuníquese con un nutricionista para obtener más información.       Resumen    •Noe dieta suave se compone de alimentos que, en general, son blandos y no tienen mucha grasa, fibra ni condimentos adicionales.      •Para el cuerpo, es más fácil digerir alimentos sin grasa, fibra o condimentos.      •Consulte a graf médico para khushi cuánto tiempo debe seguir nicholas plan de alimentación. Esta dieta no está destinada a seguirse nunu mucho tiempo.      Esta información no tiene kevin fin reemplazar el consejo del médico. Asegúrese de hacerle al médico cualquier pregunta que tenga.      Náuseas y vómitos agudos    LO QUE NECESITA SABER:    ¿Qué son las náuseas y vómitos agudos?Las náuseas y los vómitos agudos comienzan de forma repentina, empeoran rápidamente y dillon un período breve de tiempo.    ¿Cuáles son algunas causas frecuentes de las náuseas y los vómitos agudos?  •La intoxicación alimentaria      •Grandes cantidades de alcohol      •Ciertos medicamentos, demasiada cantidad de cualquier medicamento o la interrupción repentina de un medicamento regular      •[place] tempranas del embarazo      •Infección en el estómago, los intestinos u otros órganos      •Traumatismo en la shimon      •Ansiedad o estrés      •Gastroparesia (noe condición de roberto carlos que impide que el estómago se vacíe adecuadamente)      •Trastornos metabólicos, kevin uremia o insuficiencia suprarrenal      ¿Cuáles son las causas de las náuseas y vómitos agudos con dolor de estómago?  •Inflamación del apéndice, la vesícula biliar, el estómago, el páncreas, los riñones y otros órganos      •Cálculos biliares      •Noe bacteria o parásito en el aparato digestivo      •[place] cardíaco      •Úlceras estomacales u obstrucción o contorsión intestinal      ¿Cuáles son las causas de las náuseas y vómitos agudos con otros signos y síntomas?Usted podría sudar y tener la piel pálida, tener problemas digestivos y salivar más que de costumbre. Es posible que estos signos y síntomas moreno el resultado de lo siguiente:  •Problemas con graf pulso, flujo hacia el músculo del corazón, presión arterial, la tyron o líquido del estómago      •Aumento de la presión o hemorragia en el cerebro      •Inflamación del tejido que recubre el cerebro      •Migrañas o convulsiones      •Trastornos del oído interno que causan problemas de equilibrio      ¿Cómo se diagnostica la causa de las náuseas y los vómitos agudos?Graf médico lo examinará y le preguntará sobre graf historial médico. Informe a graf médico acerca de cualquier otro signo y síntoma que tenga. Informe a graf médico cuándo tuvo náuseas y vómitos por última vez y cuánto duraron. Indique si sucedió antes, nunu o después de comer, y cuánto comió. Graf médico necesitará saber cuánto salió cuando vomitó, y si fue rápido y contundente. Dígale a graf médico si graf vómito olía a evacuación intestinal o contenía tyron o alimentos. Dígale a graf médico si el vómito era de color amarillo brillante. Es posible que usted necesite alguno de los siguientes:   •Los análisis de sangrese usan para detectar noe infección o inflamación.      •Imágenes por radiografía, por resonancia magnética o por tomografía computarizadase pueden usar para detectar noe lesión o un bloqueo.      ¿Cómo se tratan las náuseas y vómitos agudos?El primer objetivo del tratamiento para las náuseas y los vómitos es prevenir o tratar la deshidratación. El tratamiento también dependerá de la causa de las náuseas y vómitos. También se tratará cualquier condición médica que esté causando las náuseas y los vómitos. La meta del tratamiento también es detener o prevenir melissa signos y síntomas. Usted podría necesitar chhaya o más de los siguientes:  •Los medicamentosse puede administrar para calmarle el estómago y detener melissa vómitos. Usted también puede necesitar medicamentos para ayudarlo a sentirse más relajado o para detener las náuseas y los vómitos causados por el mareo por movimiento. Pueden usarse estimulantes gastrointestinales para ayudar a vaciar graf estómago y los intestinos. [place] puede ayudar a disminuir las náuseas y los vómitos.      •Líquidos por vía intravenosa (IV)podrían administrarse para reemplazar los electrolitos y fluidos perdidos. [place] puede ser necesario si no puede beber líquidos.      •Sonda nasogástrica (NG):Se coloca noe sonda NG dentro de graf nariz, y pasa por graf garganta hasta que llega al estómago. Se puede administrar medicamento o alimento a través de la sonda nasogástrica, si usted no puede kwasi nada por la boca. Si en vez de esto, el médico necesita mantener graf estómago vacío, podría conectar el tubo a noe máquina de succión.      ¿Qué puedo hacer para prevenir o tratar las náuseas y los vómitos agudos?  •No consuma alcohol.El alcohol podría causarle malestar o irritación estomacal. Noe cantidad elevada de alcohol también puede causar náuseas y vómitos agudos.      •Controle el estrés.Los francesco de shimon que son el resultado de tensión nerviosa pueden causar náuseas y vómitos. Busque la manera de relajarse y controlar el estrés. Descanse y duerma más.      •[place] más líquidos kevin se le indique.Los vómitos pueden llevar a la deshidratación. Es importante beber más líquidos para ayudar a reemplazar los fluidos corporales perdidos. Pregunte a graf médico sobre la cantidad de líquido que necesita kwasi todos los usha y cuáles le recomienda. Graf médico podría recomendarle que tome noe solución de rehidratación oral (SRO). Las soluciones de rehidratación oral contienen la cantidad adecuada de agua, sales y azúcar que necesita para restituir los líquidos que perdió graf organismo. Pregunte qué tipo de solución de rehidratación oral debe usar, qué cantidad debe kwasi y dónde puede obtenerla.      •Ingiera comidas más pequeñas, más a menudo.Coma pequeñas cantidades de comida cada 2 o 3 horas, incluso si no tiene hambre. [place] náuseas podrían disminuir si tiene comida en el estómago.      •Hable con graf médico antes de kwasi medicamentos de venta doris.Estos medicamentos pueden causar problemas serios si los usa junto con ciertos medicamentos o si tiene determinadas condiciones médicas. Podrían tener problemas si usa noe dosis demasiado marquez o los usa nunu más tiempo de lo indicado. Siga las indicaciones de la etiqueta al pie de la letra.      ¿Cuándo lizett buscar atención inmediata?  •Usted nota tyron en graf vómito o en melissa evacuaciones.      •Usted siente un dolor súbito e intenso en el pecho y la parte superior de graf abdomen después de tener vómitos ru o tratar de vomitar.      •Usted tiene el steven y el pecho inflamados.      •Usted está mareado, tiene frío, sed y sequedad en los ojos y la boca.      •Usted está orinando muy poco o nada en absoluto.      •Usted tiene debilidad muscular, calambres en las piernas y dificultad para respirar.      •Graf corazón late más rápido que de costumbre.      •Usted continúa vomitando por más de 48 horas.      ¿Cuándo lizett comunicarme con mi médico?  •Usted tiene arcadas secas (vómitos sin que salga nada) frecuentes.      •[place] náusea y vómitos no mejoran ni desaparecen después de usar el medicamento.      •Usted tiene preguntas o inquietudes acerca de graf condición o cuidado.      ACUERDOS SOBRE GRAF CUIDADO:    Usted tiene el derecho de ayudar a planear graf cuidado. Aprenda todo lo que pueda sobre graf condición y kevin darle tratamiento. Discuta melissa opciones de tratamiento con meilssa médicos para decidir el cuidado que usted desea recibir. Usted siempre tiene el derecho de rechazar el tratamiento.

## 2022-03-20 NOTE — ED STATDOCS - OBJECTIVE STATEMENT
38 y/o female with no PMHx c/o abd pain. Pt reports she had the pain along with diarrhea since this morning. Pt denies vomiting or other sick contacts.

## 2022-03-20 NOTE — ED STATDOCS - NS ED ATTENDING STATEMENT MOD
This was a shared visit with the SHYANNE. I reviewed and verified the documentation and independently performed the documented:

## 2022-03-20 NOTE — ED ADULT NURSE NOTE - OBJECTIVE STATEMENT
Pt pw c/o abd pain, decreased PO intake and diarrhea started yesterday, Denies N/V, dysuria, hematuria, fevers, chills.

## 2022-03-20 NOTE — ED STATDOCS - NSFOLLOWUPCLINICS_GEN_ALL_ED_FT
[id number] [place] [address]  Phone: [phone]  Fax:      [address] [place] [address]  Phone: [phone]  Fax:

## 2022-03-20 NOTE — ED STATDOCS - ATTENDING CONTRIBUTION TO CARE
I, Wanda López, performed the initial face to face bedside interview with this patient regarding history of present illness, review of symptoms and relevant past medical, social and family history.  I completed an independent physical examination.  I was the initial provider who evaluated this patient. I have signed out the follow up of any pending tests (i.e. labs, radiological studies) to the ACP.  I have communicated the patient’s plan of care and disposition with the ACP.  The history, relevant review of systems, past medical and surgical history, medical decision making, and physical examination was documented by the scribe in my presence and I attest to the accuracy of the documentation.

## 2022-03-20 NOTE — ED STATDOCS - PATIENT PORTAL LINK FT
You can access the FollowMyHealth Patient Portal offered by [facility] by registering at the following website: [email or web address]. By joining [employer]’s FollowMyHealth portal, you will also be able to view your health information using other applications (apps) compatible with our system. You can access the FollowMyHealth Patient Portal offered by [facility] by registering at the following website: [email or web address]. By joining [employer]’s FollowMyHealth portal, you will also be able to view your health information using other applications (apps) compatible with our system. You can access the FollowMyHealth Patient Portal offered by [facility] by registering at the following website: [email or web address]. By joining [employer]’s FollowMyHealth portal, you will also be able to view your health information using other applications (apps) compatible with our system.

## 2022-03-21 VITALS — SYSTOLIC BLOOD PRESSURE: 92 MMHG | DIASTOLIC BLOOD PRESSURE: 61 MMHG

## 2022-03-21 LAB
ALBUMIN SERPL ELPH-MCNC: 4 G/DL — SIGNIFICANT CHANGE UP (ref 3.3–5.2)
ALP SERPL-CCNC: 64 U/L — SIGNIFICANT CHANGE UP (ref 40–120)
ALT FLD-CCNC: 32 U/L — SIGNIFICANT CHANGE UP
ANION GAP SERPL CALC-SCNC: 15 MMOL/L — SIGNIFICANT CHANGE UP (ref 5–17)
AST SERPL-CCNC: 28 U/L — SIGNIFICANT CHANGE UP
BASOPHILS # BLD AUTO: 0.09 K/UL — SIGNIFICANT CHANGE UP (ref 0–0.2)
BASOPHILS NFR BLD AUTO: 0.9 % — SIGNIFICANT CHANGE UP (ref 0–2)
BILIRUB SERPL-MCNC: 2.6 MG/DL — HIGH (ref 0.4–2)
BUN SERPL-MCNC: 14.5 MG/DL — SIGNIFICANT CHANGE UP (ref 8–20)
CALCIUM SERPL-MCNC: 7.9 MG/DL — LOW (ref 8.6–10.2)
CHLORIDE SERPL-SCNC: 102 MMOL/L — SIGNIFICANT CHANGE UP (ref 98–107)
CO2 SERPL-SCNC: 18 MMOL/L — LOW (ref 22–29)
CREAT SERPL-MCNC: 0.59 MG/DL — SIGNIFICANT CHANGE UP (ref 0.5–1.3)
EGFR: 119 ML/MIN/1.73M2 — SIGNIFICANT CHANGE UP
EOSINOPHIL # BLD AUTO: 0.17 K/UL — SIGNIFICANT CHANGE UP (ref 0–0.5)
EOSINOPHIL NFR BLD AUTO: 1.7 % — SIGNIFICANT CHANGE UP (ref 0–6)
GLUCOSE SERPL-MCNC: 72 MG/DL — SIGNIFICANT CHANGE UP (ref 70–99)
HCT VFR BLD CALC: 40.4 % — SIGNIFICANT CHANGE UP (ref 34.5–45)
HGB BLD-MCNC: 13.6 G/DL — SIGNIFICANT CHANGE UP (ref 11.5–15.5)
LIDOCAIN IGE QN: 33 U/L — SIGNIFICANT CHANGE UP (ref 22–51)
LYMPHOCYTES # BLD AUTO: 0.53 K/UL — LOW (ref 1–3.3)
LYMPHOCYTES # BLD AUTO: 5.2 % — LOW (ref 13–44)
MANUAL SMEAR VERIFICATION: SIGNIFICANT CHANGE UP
MCHC RBC-ENTMCNC: 31.3 PG — SIGNIFICANT CHANGE UP (ref 27–34)
MCHC RBC-ENTMCNC: 33.7 GM/DL — SIGNIFICANT CHANGE UP (ref 32–36)
MCV RBC AUTO: 92.9 FL — SIGNIFICANT CHANGE UP (ref 80–100)
MONOCYTES # BLD AUTO: 0.53 K/UL — SIGNIFICANT CHANGE UP (ref 0–0.9)
MONOCYTES NFR BLD AUTO: 5.2 % — SIGNIFICANT CHANGE UP (ref 2–14)
NEUTROPHILS # BLD AUTO: 8.87 K/UL — HIGH (ref 1.8–7.4)
NEUTROPHILS NFR BLD AUTO: 87 % — HIGH (ref 43–77)
PLAT MORPH BLD: NORMAL — SIGNIFICANT CHANGE UP
PLATELET # BLD AUTO: 206 K/UL — SIGNIFICANT CHANGE UP (ref 150–400)
POIKILOCYTOSIS BLD QL AUTO: SIGNIFICANT CHANGE UP
POTASSIUM SERPL-MCNC: 3.8 MMOL/L — SIGNIFICANT CHANGE UP (ref 3.5–5.3)
POTASSIUM SERPL-SCNC: 3.8 MMOL/L — SIGNIFICANT CHANGE UP (ref 3.5–5.3)
PROT SERPL-MCNC: 7 G/DL — SIGNIFICANT CHANGE UP (ref 6.6–8.7)
RBC # BLD: 4.35 M/UL — SIGNIFICANT CHANGE UP (ref 3.8–5.2)
RBC # FLD: 11.9 % — SIGNIFICANT CHANGE UP (ref 10.3–14.5)
RBC BLD AUTO: ABNORMAL
SODIUM SERPL-SCNC: 135 MMOL/L — SIGNIFICANT CHANGE UP (ref 135–145)
WBC # BLD: 10.19 K/UL — SIGNIFICANT CHANGE UP (ref 3.8–10.5)
WBC # FLD AUTO: 10.19 K/UL — SIGNIFICANT CHANGE UP (ref 3.8–10.5)

## 2022-03-21 PROCEDURE — 85025 COMPLETE CBC W/AUTO DIFF WBC: CPT

## 2022-03-21 PROCEDURE — 80053 COMPREHEN METABOLIC PANEL: CPT

## 2022-03-21 PROCEDURE — 83690 ASSAY OF LIPASE: CPT

## 2022-03-21 PROCEDURE — 99284 EMERGENCY DEPT VISIT MOD MDM: CPT | Mod: 25

## 2022-03-21 PROCEDURE — 36415 COLL VENOUS BLD VENIPUNCTURE: CPT

## 2022-03-21 PROCEDURE — 96376 TX/PRO/DX INJ SAME DRUG ADON: CPT

## 2022-03-21 PROCEDURE — 96374 THER/PROPH/DIAG INJ IV PUSH: CPT

## 2022-03-21 PROCEDURE — 81025 URINE PREGNANCY TEST: CPT

## 2022-03-21 PROCEDURE — 96375 TX/PRO/DX INJ NEW DRUG ADDON: CPT

## 2022-03-21 PROCEDURE — 81001 URINALYSIS AUTO W/SCOPE: CPT

## 2022-03-21 RX ORDER — ONDANSETRON 8 MG/1
4 TABLET, FILM COATED ORAL ONCE
Refills: 0 | Status: COMPLETED | OUTPATIENT
Start: 2022-03-21 | End: 2022-03-21

## 2022-03-21 RX ORDER — ONDANSETRON 8 MG/1
1 TABLET, FILM COATED ORAL
Qty: 9 | Refills: 0
Start: 2022-03-21 | End: 2022-03-23

## 2022-03-21 RX ADMIN — ONDANSETRON 4 MILLIGRAM(S): 8 TABLET, FILM COATED ORAL at 00:33

## 2022-08-30 ENCOUNTER — EMERGENCY (EMERGENCY)
Facility: HOSPITAL | Age: 37
LOS: 1 days | Discharge: HOME | End: 2022-08-30
Attending: EMERGENCY MEDICINE
Payer: SELF-PAY

## 2022-08-30 VITALS
RESPIRATION RATE: 16 BRPM | HEART RATE: 64 BPM | WEIGHT: 117.95 LBS | TEMPERATURE: 98 F | OXYGEN SATURATION: 98 % | HEIGHT: 62 IN | DIASTOLIC BLOOD PRESSURE: 79 MMHG | SYSTOLIC BLOOD PRESSURE: 119 MMHG

## 2022-08-30 PROCEDURE — 99284 EMERGENCY DEPT VISIT MOD MDM: CPT

## 2022-08-30 PROCEDURE — 71101 X-RAY EXAM UNILAT RIBS/CHEST: CPT | Mod: 26

## 2022-08-30 PROCEDURE — 99283 EMERGENCY DEPT VISIT LOW MDM: CPT

## 2022-08-30 PROCEDURE — 71101 X-RAY EXAM UNILAT RIBS/CHEST: CPT

## 2022-08-30 RX ORDER — METHOCARBAMOL 500 MG/1
1500 TABLET, FILM COATED ORAL ONCE
Refills: 0 | Status: COMPLETED | OUTPATIENT
Start: 2022-08-30 | End: 2022-08-30

## 2022-08-30 RX ORDER — ACETAMINOPHEN 500 MG
975 TABLET ORAL ONCE
Refills: 0 | Status: COMPLETED | OUTPATIENT
Start: 2022-08-30 | End: 2022-08-30

## 2022-08-30 RX ORDER — METHOCARBAMOL 500 MG/1
1 TABLET, FILM COATED ORAL
Qty: 15 | Refills: 0
Start: 2022-08-30 | End: 2022-09-03

## 2022-08-30 RX ADMIN — METHOCARBAMOL 1500 MILLIGRAM(S): 500 TABLET, FILM COATED ORAL at 16:20

## 2022-08-30 RX ADMIN — Medication 975 MILLIGRAM(S): at 16:20

## 2022-08-30 NOTE — ED PROVIDER NOTE - WR INTERPRETATION 1
MSK XR negative - No fracture, No dislocation, No foreign body CXR negative - No infiltrates, No consolidation, No atelectasis seen
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
## 2022-08-30 NOTE — ED PROVIDER NOTE - NS ED ROS FT
Gen: denies fever, chills, fatigue, weight loss  Skin: denies rashes, laceration, bruising  HEENT: denies visual changes, ear pain, nasal congestion, throat pain  Respiratory: denies MARK, SOB, cough, wheezing  Cardiovascular: denies chest pain, palpitations, diaphoresis, LE edema  GI: denies abdominal pain, n/v/d  : denies dysuria, frequency, urgency, bowel/bladder incontinence  MSK: +L side neck pain, +L shoulder pain, +Left side rib pain. denies back pain  Neuro: denies headache, dizziness, weakness, numbness  Psych: denies anxiety, depression, SI/HI, visual/auditory hallucinations

## 2022-08-30 NOTE — ED PROVIDER NOTE - PATIENT PORTAL LINK FT
You can access the FollowMyHealth Patient Portal offered by [facility] by registering at the following website: [email or web address]. By joining [employer]’s FollowMyHealth portal, you will also be able to view your health information using other applications (apps) compatible with our system.
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
## 2022-08-30 NOTE — ED PROVIDER NOTE - PHYSICAL EXAMINATION
Gen: No acute distress, non toxic  HENT: NCAT, Mucous membranes moist, Oropharynx without exudates, uvula midline  Eyes: pink conjunctivae, EOMI, PERRL  CV: RRR, nl s1/s2.  Resp: CTAB, normal rate and effort  GI: Abdomen soft, NT, ND. No rebound, no guarding  : No CVAT  Neuro: A&O x 3, CN II-XII intact, sensorimotor intact without deficits   MSK: No midline c/t/l spine ttp or step offs. FROM c-spine. +TTP along L trapezius. FROM b/l UE. +Mild ttp left side ribs  Skin: No rashes. intact and perfused.

## 2022-08-30 NOTE — ED PROVIDER NOTE - NSFOLLOWUPINSTRUCTIONS_ED_ALL_ED_FT
- Follow up with your doctor within 2-3 days.   - Return to the ED for any new or worsening symptoms included but not limited to weakness, urinary symptoms, numbness, difficulty walking, bowel/bladder incontinence, fevers, etc  - May take ibuprofen 600mg every 6 hours as needed for pain  - Avoid heavy lifting, significant exertion  - Apply heating pads/warm compresses to affected area while resting       - Seguimiento con lopez médico dentro de 2-3 días.  - Regrese al servicio de urgencias por cualquier síntoma nuevo o que empeore, incluidos, entre otros, debilidad, síntomas urinarios, entumecimiento, dificultad para caminar, incontinencia intestinal/vejiga, fiebre, etc.  - Puede kwasi ibuprofeno 600 mg cada 6 horas según sea necesario para el dolor  - Evite levantar objetos pesados, esfuerzos significativos  - Aplique almohadillas térmicas/compresas calientes en el área afectada mientras descansa
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
## 2022-08-30 NOTE — ED ADULT TRIAGE NOTE - HEIGHT IN FEET
5 Render Note In Bullet Format When Appropriate: No Consent: The patient's consent was obtained including but not limited to risks of crusting, scabbing, blistering, scarring, darker or lighter pigmentary change, recurrence, incomplete removal and infection. Detail Level: Zone Duration Of Freeze Thaw-Cycle (Seconds): 0 Post-Care Instructions: I reviewed with the patient in detail post-care instructions. Patient is to wear sunprotection, and avoid picking at any of the treated lesions. Pt may apply Vaseline to crusted or scabbing areas. Detail Level: Detailed Medical Necessity Clause: This procedure was medically necessary because the lesions that were treated were: Medical Necessity Information: It is in your best interest to select a reason for this procedure from the list below. All of these items fulfill various CMS LCD requirements except the new and changing color options.
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
## 2023-02-06 NOTE — ED ADULT NURSE NOTE - GENITOURINARY ASSESSMENT
Care Management Follow Up    Length of Stay (days): 0    Expected Discharge Date: 02/28/2023     Concerns to be Addressed:       Patient plan of care discussed at interdisciplinary rounds: Yes    Anticipated Discharge Disposition:  once placement found by relocation worker           Additional Information:  Resent referral to [facility]. No placement found yet. SW continues to follow.     DANITA Troy, [facility]  Emergency Room   [phone]-Please contact the SW on the floor in which the patient is staying for any questions or concerns   WDL

## 2023-07-03 NOTE — ED ADULT NURSE NOTE - CAS TRG GEN SKIN COLOR
CARDIAC REHAB NOTE  Patient Name: Rosa Davis  Patient : 1964      Patient attended cardiac rehab exercise session today and tolerated well with no complaints. Reports taking all medication and denies chest pain at entry. Further documentation will be scanned into the medical record upon discharge.    Patient has option to wear mask or not and maintained social distancing while exercising. Due to COVID-19 exercise blood pressures were not obtained. All COVID-19 guidelines were followed.    Normal for race

## 2024-08-14 NOTE — ED ADULT NURSE NOTE - CARDIO WDL
Abdomen , soft, non-tender, non-distended , no guarding or rigidity , no masses palpable , normal bowel sounds Liver and Spleen , no hepatomegaly present , liver non-tender , spleen not palpable Normal rate, regular rhythm, normal S1, S2 heart sounds heard.

## 2025-02-25 NOTE — ED ADULT TRIAGE NOTE - PAIN: PRESENCE, MLM
[Cooperative] : cooperative [Anxious] : anxious [Full] : full [Clear] : clear [Racing] : racing [Preoccupations/Ruminations] : preoccupations/ruminations [Average] : average [WNL] : within normal limits [Mild] : mild [5 - Markedly ill] : 5 - Markedly ill  (intrusive symptoms that distinctly impair social/occupational function or cause intrusive levels of distress) [3 - Minimally improved] : 3 - Minimally improved  (slightly better with little or no clinically meaningful reduction of symptoms. Represents very little change in basic clinical status, level of care, or functional capacity) [Individual reports tobacco use during the last 30 days?] : Individual reports tobacco use during the last 30 days? Yes [Individual reports use of the following tobacco products during the last 30 days?] : Individual reports use of the following tobacco products during the last 30 days? Yes -  [Cigarettes] : Cigarettes [Individual reports current use of tobacco cessation medication or nicotine replacement therapy?] : Individual reports current use of tobacco cessation medication or nicotine replacement therapy? No [Was tobacco cessation medication and/or nicotine replacement therapy recommended?] : Was tobacco cessation medication and/or nicotine replacement therapy recommended? Yes [Does individual accept referral to MD for cessation medication or NRT?] : Does individual accept referral to MD for cessation medication or NRT? No complains of pain/discomfort